# Patient Record
Sex: MALE | URBAN - NONMETROPOLITAN AREA
[De-identification: names, ages, dates, MRNs, and addresses within clinical notes are randomized per-mention and may not be internally consistent; named-entity substitution may affect disease eponyms.]

---

## 2020-10-21 ENCOUNTER — NEW PATIENT (OUTPATIENT)
Dept: URBAN - NONMETROPOLITAN AREA CLINIC 15 | Facility: CLINIC | Age: 65
End: 2020-10-21
Payer: COMMERCIAL

## 2020-10-21 DIAGNOSIS — H16.223 KERATOCONJUNCTIVITIS SICCA, BILATERAL: ICD-10-CM

## 2020-10-21 DIAGNOSIS — H52.4 PRESBYOPIA: Primary | ICD-10-CM

## 2020-10-21 PROCEDURE — 92015 DETERMINE REFRACTIVE STATE: CPT | Performed by: OPTOMETRIST

## 2020-10-21 PROCEDURE — 92004 COMPRE OPH EXAM NEW PT 1/>: CPT | Performed by: OPTOMETRIST

## 2020-10-21 ASSESSMENT — INTRAOCULAR PRESSURE
OD: 19
OS: 17

## 2020-10-21 ASSESSMENT — VISUAL ACUITY
OD: 20/20
OS: 20/20

## 2021-12-28 ENCOUNTER — OFFICE VISIT (OUTPATIENT)
Dept: URBAN - NONMETROPOLITAN AREA CLINIC 14 | Facility: CLINIC | Age: 66
End: 2021-12-28
Payer: MEDICARE

## 2021-12-28 DIAGNOSIS — H25.813 COMBINED FORMS OF AGE-RELATED CATARACT, BILATERAL: ICD-10-CM

## 2021-12-28 DIAGNOSIS — E11.9 DIABETES MELLITUS TYPE 2 WITHOUT MENTION OF COMPLICATION: Primary | ICD-10-CM

## 2021-12-28 PROCEDURE — 99214 OFFICE O/P EST MOD 30 MIN: CPT | Performed by: OPTOMETRIST

## 2021-12-28 ASSESSMENT — INTRAOCULAR PRESSURE
OD: 18
OS: 17

## 2021-12-28 ASSESSMENT — VISUAL ACUITY
OS: 20/20
OD: 20/20

## 2021-12-28 NOTE — IMPRESSION/PLAN
Impression: Diabetes mellitus Type 2 without mention of complication: C82.9. Plan: Diet controlled. Monitor x 1 year. Educated pt. on need for tight BG control, compliance with medication, and regular F/U with PCP.

## 2021-12-28 NOTE — IMPRESSION/PLAN
Impression: Keratoconjunctivitis sicca, bilateral: R23.244. Plan: Cont ATs QID OU. Educated pt. on need for regular treatment and F/U.

## 2023-01-05 ENCOUNTER — OFFICE VISIT (OUTPATIENT)
Dept: URBAN - NONMETROPOLITAN AREA CLINIC 14 | Facility: CLINIC | Age: 68
End: 2023-01-05
Payer: MEDICARE

## 2023-01-05 DIAGNOSIS — H33.311 HORSESHOE TEAR OF RETINA WITHOUT DETACHMENT, RIGHT EYE: ICD-10-CM

## 2023-01-05 DIAGNOSIS — H16.223 KERATOCONJUNCTIVITIS SICCA, BILATERAL: ICD-10-CM

## 2023-01-05 DIAGNOSIS — E11.9 DIABETES MELLITUS TYPE 2 WITHOUT MENTION OF COMPLICATION: Primary | ICD-10-CM

## 2023-01-05 DIAGNOSIS — H25.813 COMBINED FORMS OF AGE-RELATED CATARACT, BILATERAL: ICD-10-CM

## 2023-01-05 PROCEDURE — 99214 OFFICE O/P EST MOD 30 MIN: CPT | Performed by: OPTOMETRIST

## 2023-01-05 ASSESSMENT — INTRAOCULAR PRESSURE
OD: 22
OS: 21

## 2023-01-05 NOTE — IMPRESSION/PLAN
Impression: Diabetes mellitus Type 2 without mention of complication: N04.9. Plan: Diet controlled. Monitor x 1 year. Educated pt. on need for tight BG control, compliance with medication, and regular F/U with PCP.

## 2023-01-05 NOTE — IMPRESSION/PLAN
Impression: Keratoconjunctivitis sicca, bilateral: T22.162. Plan: Cont ATs QID OU. Educated pt. on need for regular treatment and F/U.

## 2023-01-05 NOTE — IMPRESSION/PLAN
Impression: Horseshoe tear of retina without detachment, right eye: H33.311. Plan: Pt asymptomatic. Refer to retina ASAP for consult and repair. Educated pt. on all findings.

## 2023-01-06 ENCOUNTER — OFFICE VISIT (OUTPATIENT)
Dept: URBAN - METROPOLITAN AREA CLINIC 13 | Facility: CLINIC | Age: 68
End: 2023-01-06
Payer: MEDICARE

## 2023-01-06 DIAGNOSIS — H25.12 AGE-RELATED NUCLEAR CATARACT, LEFT EYE: ICD-10-CM

## 2023-01-06 DIAGNOSIS — H43.811 VITREOUS DEGENERATION, RIGHT EYE: ICD-10-CM

## 2023-01-06 DIAGNOSIS — H33.301 RETINAL BREAK OF RIGHT EYE: Primary | ICD-10-CM

## 2023-01-06 DIAGNOSIS — H33.193 OTHER RETINOSCHISIS AND RETINAL CYSTS, BILATERAL: ICD-10-CM

## 2023-01-06 PROCEDURE — 92134 CPTRZ OPH DX IMG PST SGM RTA: CPT | Performed by: OPHTHALMOLOGY

## 2023-01-06 PROCEDURE — 67145 PROPH RTA DTCHMNT PC: CPT | Performed by: OPHTHALMOLOGY

## 2023-01-06 PROCEDURE — 99203 OFFICE O/P NEW LOW 30 MIN: CPT | Performed by: OPHTHALMOLOGY

## 2023-01-06 ASSESSMENT — INTRAOCULAR PRESSURE
OD: 17
OS: 15

## 2023-01-06 NOTE — IMPRESSION/PLAN
Impression: Retinal break of right eye: H33.301. Plan: The patient has a retinal tear which could progress into a retinal detachment causing further visual loss. We discussed the natural history and risk and benefits of the various treatment options including laser and cryo treatment. Risks of treatment include but are not limited to reduced peripheral vision, pain, swelling, recurrent hemorrhage, progressive retinal tear or retinal detachment, new retinal tear and need for additional laser, cryo, or surgery. The patient understands that some important surgical tasks may be performed by a fellow under my direct supervision, and consents to such. After discussing the risks, benefits, indications, and alternatives, the patient elects to proceed with laser treatment to the retinal tear. Timeout was performed before procedure. 

1m DE OD

## 2023-01-06 NOTE — IMPRESSION/PLAN
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 OCT OU - no IRF/SRF OU  / 289  Plan: Bilateral and IT Please see above

## 2024-01-05 ENCOUNTER — OFFICE VISIT (OUTPATIENT)
Dept: URBAN - NONMETROPOLITAN AREA CLINIC 14 | Facility: CLINIC | Age: 69
End: 2024-01-05
Payer: MEDICARE

## 2024-01-05 DIAGNOSIS — H25.813 COMBINED FORMS OF AGE-RELATED CATARACT, BILATERAL: ICD-10-CM

## 2024-01-05 DIAGNOSIS — Z79.84 LONG TERM (CURRENT) USE OF ORAL ANTIDIABETIC DRUGS: ICD-10-CM

## 2024-01-05 DIAGNOSIS — E11.9 DIABETES MELLITUS TYPE 2 WITHOUT MENTION OF COMPLICATION: Primary | ICD-10-CM

## 2024-01-05 PROCEDURE — 99214 OFFICE O/P EST MOD 30 MIN: CPT | Performed by: OPTOMETRIST

## 2024-01-05 ASSESSMENT — INTRAOCULAR PRESSURE
OS: 18
OD: 17

## 2024-10-17 ENCOUNTER — OFFICE VISIT (OUTPATIENT)
Dept: URBAN - NONMETROPOLITAN AREA CLINIC 14 | Facility: CLINIC | Age: 69
End: 2024-10-17
Payer: MEDICARE

## 2024-10-17 DIAGNOSIS — H33.193 OTHER RETINOSCHISIS AND RETINAL CYSTS, BILATERAL: ICD-10-CM

## 2024-10-17 DIAGNOSIS — H25.813 COMBINED FORMS OF AGE-RELATED CATARACT, BILATERAL: ICD-10-CM

## 2024-10-17 DIAGNOSIS — Z79.84 LONG TERM (CURRENT) USE OF ORAL ANTIDIABETIC DRUGS: ICD-10-CM

## 2024-10-17 DIAGNOSIS — E11.9 DIABETES MELLITUS TYPE 2 WITHOUT MENTION OF COMPLICATION: Primary | ICD-10-CM

## 2024-10-17 PROCEDURE — 92134 CPTRZ OPH DX IMG PST SGM RTA: CPT | Performed by: OPTOMETRIST

## 2024-10-17 PROCEDURE — 92014 COMPRE OPH EXAM EST PT 1/>: CPT | Performed by: OPTOMETRIST

## 2024-10-17 ASSESSMENT — INTRAOCULAR PRESSURE
OD: 17
OS: 17